# Patient Record
Sex: MALE | URBAN - METROPOLITAN AREA
[De-identification: names, ages, dates, MRNs, and addresses within clinical notes are randomized per-mention and may not be internally consistent; named-entity substitution may affect disease eponyms.]

---

## 2020-08-14 ENCOUNTER — OFFICE VISIT (OUTPATIENT)
Dept: URBAN - METROPOLITAN AREA SURGERY CENTER 15 | Facility: SURGERY CENTER | Age: 50
End: 2020-08-14
Payer: COMMERCIAL

## 2020-08-14 DIAGNOSIS — D12.4 ADENOMA OF DESCENDING COLON: ICD-10-CM

## 2020-08-14 DIAGNOSIS — Z12.11 COLON CANCER SCREENING: ICD-10-CM

## 2020-08-14 PROCEDURE — G8907 PT DOC NO EVENTS ON DISCHARG: HCPCS | Performed by: INTERNAL MEDICINE

## 2020-08-14 PROCEDURE — 45385 COLONOSCOPY W/LESION REMOVAL: CPT | Performed by: INTERNAL MEDICINE

## 2020-08-14 PROCEDURE — G9933 CANC DETECTD DURING COL SCRN: HCPCS | Performed by: INTERNAL MEDICINE

## 2024-04-04 ENCOUNTER — DAC (OUTPATIENT)
Dept: URBAN - METROPOLITAN AREA SURGERY CENTER 15 | Facility: SURGERY CENTER | Age: 54
End: 2024-04-04
Payer: COMMERCIAL

## 2024-04-04 ENCOUNTER — LAB (OUTPATIENT)
Dept: URBAN - METROPOLITAN AREA CLINIC 4 | Facility: CLINIC | Age: 54
End: 2024-04-04
Payer: COMMERCIAL

## 2024-04-04 DIAGNOSIS — Z86.010 PERSONAL HISTORY OF COLONIC POLYPS: ICD-10-CM

## 2024-04-04 DIAGNOSIS — D12.2 BENIGN NEOPLASM OF ASCENDING COLON: ICD-10-CM

## 2024-04-04 DIAGNOSIS — Z09 ENCOUNTER FOR FOLLOW-UP EXAMINATION AFTER COMPLETED TREATMENT FOR CONDITIONS OTHER THAN MALIGNANT NEOPLASM: ICD-10-CM

## 2024-04-04 PROCEDURE — 88305 TISSUE EXAM BY PATHOLOGIST: CPT | Performed by: PATHOLOGY

## 2024-04-04 PROCEDURE — 45385 COLONOSCOPY W/LESION REMOVAL: CPT | Performed by: INTERNAL MEDICINE

## 2024-11-05 ENCOUNTER — OFFICE VISIT (OUTPATIENT)
Dept: URBAN - METROPOLITAN AREA CLINIC 78 | Facility: CLINIC | Age: 54
End: 2024-11-05
Payer: COMMERCIAL

## 2024-11-05 ENCOUNTER — DASHBOARD ENCOUNTERS (OUTPATIENT)
Age: 54
End: 2024-11-05

## 2024-11-05 VITALS
HEART RATE: 56 BPM | SYSTOLIC BLOOD PRESSURE: 128 MMHG | BODY MASS INDEX: 29 KG/M2 | DIASTOLIC BLOOD PRESSURE: 84 MMHG | HEIGHT: 74 IN | TEMPERATURE: 98 F | WEIGHT: 226 LBS | RESPIRATION RATE: 16 BRPM

## 2024-11-05 DIAGNOSIS — K64.4 HEMORRHOIDS, EXTERNAL: ICD-10-CM

## 2024-11-05 PROBLEM — 23913003: Status: ACTIVE | Noted: 2024-11-05

## 2024-11-05 PROCEDURE — 99214 OFFICE O/P EST MOD 30 MIN: CPT

## 2024-11-05 NOTE — HPI-TODAY'S VISIT:
54-year-old male, established patient, for colonoscopy colonoscopy in April 2024 by Dr. Quach.  He presents today at the referral of Tiffanie Young to Dr. Quach for evalution of a prolapsed rectum  with rectal incontinence.  Dr Young did a YOCASTA yesterday and diagnosed him with ext hemorrohoid. He first noticed a buldge at his rectum on Sunday when taking a shower. He reports that prior to this, he was eating alot of halloween candy and was straining for production of his BMs.  He denies constipation/diarrhea.  BM are QD no blood or mucus in the stool.. He has been using Sitz baths and prep h w/lidocaine and tux with some relief. He is traveling W[place]Sunday to [place]. He denies rectal pain, but has discomfort and itching, but this is mild. He denies abd pain, N/V/GERD/ dysphagia, abn weight loss.   ~~ Prior Procedrues~~ 4/2024 colonoscopy with Dr. Quach: three 4-6 mm polyps in the ascending colon, diverticulosis in the sigmoid and descending colon, hemorrhoids, repeat in  5 years for surveillance.  PATH: TA polyps in the ascending colon.  8/14/2020 colonoscopy with Dr. Quach: Two 6 to 7 mm polyps in the descending colon, hemorrhoids, diverticulosis of the sigmoid colon.  No evidence of diverticular bleed.  Repeat in 3 to 5 years for surveillance.  PATH: TA polyps in the descending colon.